# Patient Record
Sex: MALE | Race: BLACK OR AFRICAN AMERICAN | URBAN - METROPOLITAN AREA
[De-identification: names, ages, dates, MRNs, and addresses within clinical notes are randomized per-mention and may not be internally consistent; named-entity substitution may affect disease eponyms.]

---

## 2022-08-11 ENCOUNTER — WEB ENCOUNTER (OUTPATIENT)
Dept: URBAN - METROPOLITAN AREA CLINIC 52 | Facility: CLINIC | Age: 22
End: 2022-08-11

## 2022-08-11 ENCOUNTER — OFFICE VISIT (OUTPATIENT)
Dept: URBAN - METROPOLITAN AREA CLINIC 52 | Facility: CLINIC | Age: 22
End: 2022-08-11

## 2022-08-11 RX ORDER — IBUPROFEN 400 MG/1
TAKE ONE TABLET BY MOUTH EVERY 6 HOURS AS NEEDED FOR PAIN TABLET, FILM COATED ORAL
Qty: 30 UNSPECIFIED | Refills: 0 | Status: ON HOLD | COMMUNITY

## 2022-08-11 RX ORDER — HYDROCODONE BITARTRATE AND ACETAMINOPHEN 5; 325 MG/1; MG/1
TAKE ONE TABLET BY MOUTH EVERY 6 HOURS AS NEEDED FOR PAIN TABLET ORAL
Qty: 6 UNSPECIFIED | Refills: 0 | Status: ON HOLD | COMMUNITY

## 2022-08-11 RX ORDER — CYCLOBENZAPRINE HYDROCHLORIDE 10 MG/1
TAKE ONE TABLET BY MOUTH THREE TIMES A DAY AS NEEDED FOR MUSCLE SPASM TABLET, FILM COATED ORAL
Qty: 9 UNSPECIFIED | Refills: 0 | Status: ON HOLD | COMMUNITY

## 2022-08-18 ENCOUNTER — OFFICE VISIT (OUTPATIENT)
Dept: URBAN - METROPOLITAN AREA CLINIC 94 | Facility: CLINIC | Age: 22
End: 2022-08-18
Payer: COMMERCIAL

## 2022-08-18 ENCOUNTER — DASHBOARD ENCOUNTERS (OUTPATIENT)
Age: 22
End: 2022-08-18

## 2022-08-18 VITALS
TEMPERATURE: 97.3 F | WEIGHT: 121 LBS | BODY MASS INDEX: 17.32 KG/M2 | HEIGHT: 70 IN | SYSTOLIC BLOOD PRESSURE: 139 MMHG | HEART RATE: 78 BPM | DIASTOLIC BLOOD PRESSURE: 86 MMHG

## 2022-08-18 DIAGNOSIS — L91.8 SKIN TAG: ICD-10-CM

## 2022-08-18 DIAGNOSIS — K64.8 INTERNAL HEMORRHOIDS: ICD-10-CM

## 2022-08-18 DIAGNOSIS — K62.5 RECTAL BLEEDING: ICD-10-CM

## 2022-08-18 PROCEDURE — 99203 OFFICE O/P NEW LOW 30 MIN: CPT | Performed by: INTERNAL MEDICINE

## 2022-08-18 RX ORDER — HYDROCORTISONE ACETATE 25 MG/1
1 SUPPOSITORY SUPPOSITORY RECTAL
Qty: 60 | Refills: 1 | OUTPATIENT
Start: 2022-08-18 | End: 2022-10-17

## 2022-08-18 RX ORDER — CYCLOBENZAPRINE HYDROCHLORIDE 10 MG/1
TAKE ONE TABLET BY MOUTH THREE TIMES A DAY AS NEEDED FOR MUSCLE SPASM TABLET, FILM COATED ORAL
Qty: 9 UNSPECIFIED | Refills: 0 | Status: DISCONTINUED | COMMUNITY

## 2022-08-18 RX ORDER — IBUPROFEN 400 MG/1
TAKE ONE TABLET BY MOUTH EVERY 6 HOURS AS NEEDED FOR PAIN TABLET, FILM COATED ORAL
Qty: 30 UNSPECIFIED | Refills: 0 | Status: DISCONTINUED | COMMUNITY

## 2022-08-18 RX ORDER — HYDROCODONE BITARTRATE AND ACETAMINOPHEN 5; 325 MG/1; MG/1
TAKE ONE TABLET BY MOUTH EVERY 6 HOURS AS NEEDED FOR PAIN TABLET ORAL
Qty: 6 UNSPECIFIED | Refills: 0 | Status: DISCONTINUED | COMMUNITY

## 2022-08-18 NOTE — HPI-TODAY'S VISIT:
21 yr male presents for evaluation of constipation and rectal bleeding. Pt has a long standing history of constipation. Avergae 2-3 BMs per week. Stool small and hard with straining. Pt recently had a few episodes of rectal bleeding when he saw fresh blood in commode. Pt was seen at Urgent care and was given miralax with steroid cream. Pt states that constipation has resolved now. Rectal bleeding has improved. A small amount of rectal bleeding a few days ago, none since then. Denies abdominal pain or weight loss. No family history of colorectal neoplasm.

## 2022-08-18 NOTE — PHYSICAL EXAM CARDIOVASCULAR:
no edema,  no murmurs,  regular rate and rhythm , no edema. RECTAL:  Small skin tag. Small internal hemorrhoids. No masses. No blood.

## 2022-09-21 ENCOUNTER — OFFICE VISIT (OUTPATIENT)
Dept: URBAN - METROPOLITAN AREA CLINIC 94 | Facility: CLINIC | Age: 22
End: 2022-09-21